# Patient Record
Sex: FEMALE | Race: WHITE
[De-identification: names, ages, dates, MRNs, and addresses within clinical notes are randomized per-mention and may not be internally consistent; named-entity substitution may affect disease eponyms.]

---

## 2019-04-07 ENCOUNTER — HOSPITAL ENCOUNTER (EMERGENCY)
Dept: HOSPITAL 92 - SCSER | Age: 54
Discharge: HOME | End: 2019-04-07
Payer: COMMERCIAL

## 2019-04-07 DIAGNOSIS — J20.9: Primary | ICD-10-CM

## 2019-04-07 DIAGNOSIS — F17.210: ICD-10-CM

## 2019-04-07 PROCEDURE — 71046 X-RAY EXAM CHEST 2 VIEWS: CPT

## 2019-04-07 PROCEDURE — 94640 AIRWAY INHALATION TREATMENT: CPT

## 2019-04-07 NOTE — RAD
FRadiograph chest 2 views:

4/7/2019



HISTORY:

54-year-old female with cough



COMPARISON:

None available



FINDINGS:

There are streaky densities at the medial base of the right lower lobe, with striations radiating fro
m the right lower hilum. The rest of the visualized lung fields are clear. The cardiomediastinal silh
ouette is normal. No pleural effusion or pneumothorax.



IMPRESSION:

Mild streaky densities at medial base of right lower lobe. Without prior studies, it is uncertain whe
ther this is acute or chronic. Recommend follow-up.



Reported By: Km Alonzo 

Electronically Signed:  4/7/2019 3:02 PM

## 2021-07-22 ENCOUNTER — HOSPITAL ENCOUNTER (OUTPATIENT)
Dept: HOSPITAL 18 - NAV RAD | Age: 56
Discharge: HOME | End: 2021-07-22
Payer: COMMERCIAL

## 2021-07-22 DIAGNOSIS — M25.512: Primary | ICD-10-CM

## 2021-07-22 DIAGNOSIS — M47.816: ICD-10-CM

## 2021-07-22 DIAGNOSIS — M54.5: ICD-10-CM

## 2021-07-22 DIAGNOSIS — M25.561: ICD-10-CM

## 2021-07-22 DIAGNOSIS — M19.012: ICD-10-CM

## 2021-07-22 DIAGNOSIS — M17.11: ICD-10-CM

## 2021-07-22 PROCEDURE — 72100 X-RAY EXAM L-S SPINE 2/3 VWS: CPT

## 2021-12-16 ENCOUNTER — HOSPITAL ENCOUNTER (OUTPATIENT)
Dept: HOSPITAL 92 - CSHCT | Age: 56
Discharge: HOME | End: 2021-12-16
Attending: SURGERY
Payer: COMMERCIAL

## 2021-12-16 DIAGNOSIS — Z98.84: ICD-10-CM

## 2021-12-16 DIAGNOSIS — K57.30: ICD-10-CM

## 2021-12-16 DIAGNOSIS — R10.2: Primary | ICD-10-CM

## 2021-12-16 PROCEDURE — 74177 CT ABD & PELVIS W/CONTRAST: CPT
